# Patient Record
Sex: FEMALE | Race: WHITE | NOT HISPANIC OR LATINO | Employment: UNEMPLOYED | URBAN - METROPOLITAN AREA
[De-identification: names, ages, dates, MRNs, and addresses within clinical notes are randomized per-mention and may not be internally consistent; named-entity substitution may affect disease eponyms.]

---

## 2017-03-13 ENCOUNTER — OFFICE VISIT (OUTPATIENT)
Dept: URGENT CARE | Facility: CLINIC | Age: 37
End: 2017-03-13
Payer: COMMERCIAL

## 2017-03-13 VITALS
BODY MASS INDEX: 24.16 KG/M2 | HEART RATE: 83 BPM | SYSTOLIC BLOOD PRESSURE: 110 MMHG | DIASTOLIC BLOOD PRESSURE: 80 MMHG | TEMPERATURE: 98.5 F | OXYGEN SATURATION: 98 % | HEIGHT: 65 IN | WEIGHT: 145 LBS

## 2017-03-13 DIAGNOSIS — L74.519 FOCAL HYPERHIDROSIS: ICD-10-CM

## 2017-03-13 PROCEDURE — 99214 OFFICE O/P EST MOD 30 MIN: CPT | Performed by: PHYSICIAN ASSISTANT

## 2017-03-13 RX ORDER — GLYCOPYRROLATE 2 MG/1
1-2 TABLET ORAL
Qty: 30 TAB | Refills: 0 | Status: SHIPPED | OUTPATIENT
Start: 2017-03-13

## 2017-03-13 ASSESSMENT — ENCOUNTER SYMPTOMS
NAUSEA: 0
PALPITATIONS: 0
FEVER: 0
COUGH: 0
ABDOMINAL PAIN: 0
MYALGIAS: 0
CHILLS: 0
DIZZINESS: 0
VOMITING: 0
HEADACHES: 0

## 2017-03-13 NOTE — PATIENT INSTRUCTIONS
Freda Rosado PA-C    Hyperhidrosis  It is normal to sweat when you are hot, being physically active, or feeling anxious. Sweating is a necessary function for your body. However, hyperhidrosis is when you sweat too much (excessively). Although hyperhidrosis is not dangerous, it can make you feel embarrassed.   There are two kinds of hyperhidrosis:  · Primary hyperhidrosis. The sweating usually localizes in one part of your body, such as your underarms, or in a few areas, such as your feet, face, armpits, and hands. This is the more common kind of hyperhidrosis.  · Secondary hyperhidrosis. This type more likely affects your entire body.  CAUSES  The cause of your hyperhidrosis depends on the kind you have.  · Primary hyperhidrosis may be caused by having sweat glands that are more active than normal.  · Secondary hyperhidrosis is caused by an underlying condition. Possible conditions include:  ¨ Diabetes.  ¨ Gout.  ¨ Certain medicines.  ¨ Anxiety.  ¨ Stroke.  ¨ Obesity.  ¨ Menopause.  ¨ Overactive thyroid (hyperthyroidism).  ¨ Tumors.  ¨ Frostbite.  ¨ Certain types of cancers.  ¨ Alcoholism.  ¨ Injury to your nervous system.  ¨ Stroke.  ¨ Parkinson disease.  RISK FACTORS  You may be at an increased risk for primary hyperhidrosis if you have a family history of it.  SIGNS AND SYMPTOMS  General symptoms of hyperhidrosis may include:  · Feeling like you are sweating constantly, even while you are resting.  · Having skin that peels or gets paler or softer in the areas where you sweat the most.  · Being able to see sweat on your skin.  Symptoms of primary hyperhidrosis may include:  · Sweating in specific areas, such as your armpits, palms, feet, and face.  · Sweating in the same location on both sides of your body.  · Sweating only during the day.  Symptoms of secondary hyperhidrosis may include:  · Sweating all over your body.  · Sweating even while you sleep.  DIAGNOSIS   Hyperhidrosis may be diagnosed by:  · Medical  history and physical exam.  · Testing, such as:  ¨ Sweat test.  ¨ Paper test.  TREATMENT  Your treatment will depend on the kind of hyperhidrosis you have and the parts of your body that are affected. If your hyperhidrosis is caused by an underlying condition, your treatment will address the cause. Treatment may include:  · Strong antiperspirants. Your health care provider may give you a prescription.  · Medicines taken by mouth.  · Medicines injected by your health care provider. These may include small amounts of botulinum toxin.  · Iontophoresis. This is a procedure that temporarily turns off the sweat glands in your hands and feet.  · Surgery to remove your sweat glands.  · Sympathectomy. This is a procedure that cuts or destroys your nerves so that they do not send a signal to sweat.  HOME CARE INSTRUCTIONS  · Take medicines only as directed by your health care provider.  · Use antiperspirants as directed by your health care provider.  · Limit or avoid foods or beverages that seem to increase your chances of sweating, such as:  ¨ Spicy food.  ¨ Caffeine.  ¨ Alcohol.  ¨ Foods that contain MSG.  · If your feet sweat:  ¨ Wear sandals, when possible.  ¨ Do not wear cotton socks. Wear socks that remove or wick moisture from your feet.  ¨ Wear leather shoes.  ¨ Avoid wearing the same pair of shoes two days in a row.  · Consider joining a hyperhidrosis support group.  SEEK MEDICAL CARE IF:   · You have new symptoms.  · Your symptoms get worse.     This information is not intended to replace advice given to you by your health care provider. Make sure you discuss any questions you have with your health care provider.     Document Released: 02/16/2007 Document Revised: 01/08/2016 Document Reviewed: 07/28/2015  [employer] Interactive Patient Education ©2016 Elsevier Inc.

## 2017-03-13 NOTE — PROGRESS NOTES
Subjective:      Miranda Jovel is a 36 y.o. female who presents with No chief complaint on file.    Current medications reviewed.  No past medical history on file.  Social History   Substance Use Topics   • Smoking status: Current Every Day Smoker   • Smokeless tobacco: Not on file   • Alcohol Use: Not on file     Family History Reviewed: noncontributory      36-year-old female who was a patient of Dr. Lopez and has not replaced her primary care physician. She is on maintenance medication Robinul for hyperhidrosis which she reports works fabulously. She is wanting a refill of this medication until she sees/establishes a new provider.    HPI    Review of Systems   Constitutional: Negative for fever and chills.   Respiratory: Negative for cough.    Cardiovascular: Negative for chest pain and palpitations.   Gastrointestinal: Negative for nausea, vomiting and abdominal pain.   Musculoskeletal: Negative for myalgias and joint pain.   Skin:        Increased hydration of call, soles, axillary   Neurological: Negative for dizziness and headaches.   All other systems reviewed and are negative.         Objective:     There were no vitals taken for this visit.     Physical Exam   Constitutional: She is oriented to person, place, and time. She appears well-developed and well-nourished.   Cardiovascular: Normal rate and regular rhythm.    Pulmonary/Chest: Effort normal and breath sounds normal.   Neurological: She is alert and oriented to person, place, and time.   Skin: Skin is warm and dry.   Increased hydration noted on volar palm surface of bilateral hands and also bilateral axillary. Feet were not evaluated.   Nursing note and vitals reviewed.              Assessment/Plan:     1. Focal hyperhidrosis  glycopyrrolate (ROBINUL-FORTE) 2 MG tablet     RX: robinul . A VS given with information on condition, stress establish PCP.  Patient reports understanding and agrees with plan.

## 2017-03-13 NOTE — MR AVS SNAPSHOT
"        Miranda Dunawaygiorgio   3/13/2017 1:30 PM   Office Visit   MRN: [id number]    Department:  [facility]   Dept Phone:  [phone]    Description:  Female : 1980   Provider:  Kaylee Romero PA-C           Reason for Visit     Excessive Sweating medication refill       Allergies as of 3/13/2017     No Known Allergies      You were diagnosed with     Focal hyperhidrosis   [[id number]]         Vital Signs     Blood Pressure Pulse Temperature Height Weight Body Mass Index    110/80 mmHg 83 36.9 °C (98.5 °F) 1.651 m (5' 5\") 65.772 kg (145 lb) 24.13 kg/m2    Oxygen Saturation Smoking Status                98% Current Every Day Smoker          Basic Information     Date Of Birth Sex Race Ethnicity Preferred Language    1980 Female White Non- English      Health Maintenance        Date Due Completion Dates    IMM DTaP/Tdap/Td Vaccine (1 - Tdap) 1999 ---    PAP SMEAR 2001 ---    IMM INFLUENZA (1) 2016 ---            Current Immunizations     No immunizations on file.      Below and/or attached are the medications your provider expects you to take. Review all of your home medications and newly ordered medications with your provider and/or pharmacist. Follow medication instructions as directed by your provider and/or pharmacist. Please keep your medication list with you and share with your provider. Update the information when medications are discontinued, doses are changed, or new medications (including over-the-counter products) are added; and carry medication information at all times in the event of emergency situations     Allergies:  No Known Allergies          Medications  Valid as of: 2017 -  1:53 PM    Generic Name Brand Name Tablet Size Instructions for use    ALPRAZolam (Tab) XANAX 0.5 MG Take 1 Tab by mouth 3 times a day as needed for Sleep or Anxiety.        ALPRAZolam (Tab) XANAX 0.5 MG Take 1 Tab by mouth 3 times a day as needed for Sleep.       " Cyclobenzaprine HCl (Tab) FLEXERIL 5 MG Take 1-2 Tabs by mouth 3 times a day as needed for Muscle Spasms.        Doxycycline Hyclate   Take  by mouth.        Glycopyrrolate (Tab) ROBINUL 2 MG Take 2 mg by mouth 3 times a day.        Glycopyrrolate (Tab) ROBINUL 2 MG Take 0.5-1 Tabs by mouth 1 time daily as needed (hidrosis).        Ibuprofen (Tab) MOTRIN 600 MG Take 1 Tab by mouth every 8 hours as needed for Moderate Pain.        .                 Medicines prescribed today were sent to:     [facility], [place] - [address] [facility]  [address]    [address] [place] [address]    Phone: [phone] Fax: [phone]    Open 24 Hours?: No      Medication refill instructions:       If your prescription bottle indicates you have medication refills left, it is not necessary to call your provider’s office. Please contact your pharmacy and they will refill your medication.    If your prescription bottle indicates you do not have any refills left, you may request refills at any time through one of the following ways: The online [employer] system (except Urgent Care), by calling your provider’s office, or by asking your pharmacy to contact your provider’s office with a refill request. Medication refills are processed only during regular business hours and may not be available until the next business day. Your provider may request additional information or to have a follow-up visit with you prior to refilling your medication.   *Please Note: Medication refills are assigned a new Rx number when refilled electronically. Your pharmacy may indicate that no refills were authorized even though a new prescription for the same medication is available at the pharmacy. Please request the medicine by name with the pharmacy before contacting your provider for a refill.        Instructions    Freda Rosado PA-C    Hyperhidrosis  It is normal to sweat when you are hot, being physically active,  or feeling anxious. Sweating is a necessary function for your body. However, hyperhidrosis is when you sweat too much (excessively). Although hyperhidrosis is not dangerous, it can make you feel embarrassed.   There are two kinds of hyperhidrosis:  · Primary hyperhidrosis. The sweating usually localizes in one part of your body, such as your underarms, or in a few areas, such as your feet, face, armpits, and hands. This is the more common kind of hyperhidrosis.  · Secondary hyperhidrosis. This type more likely affects your entire body.  CAUSES  The cause of your hyperhidrosis depends on the kind you have.  · Primary hyperhidrosis may be caused by having sweat glands that are more active than normal.  · Secondary hyperhidrosis is caused by an underlying condition. Possible conditions include:  ¨ Diabetes.  ¨ Gout.  ¨ Certain medicines.  ¨ Anxiety.  ¨ Stroke.  ¨ Obesity.  ¨ Menopause.  ¨ Overactive thyroid (hyperthyroidism).  ¨ Tumors.  ¨ Frostbite.  ¨ Certain types of cancers.  ¨ Alcoholism.  ¨ Injury to your nervous system.  ¨ Stroke.  ¨ Parkinson disease.  RISK FACTORS  You may be at an increased risk for primary hyperhidrosis if you have a family history of it.  SIGNS AND SYMPTOMS  General symptoms of hyperhidrosis may include:  · Feeling like you are sweating constantly, even while you are resting.  · Having skin that peels or gets paler or softer in the areas where you sweat the most.  · Being able to see sweat on your skin.  Symptoms of primary hyperhidrosis may include:  · Sweating in specific areas, such as your armpits, palms, feet, and face.  · Sweating in the same location on both sides of your body.  · Sweating only during the day.  Symptoms of secondary hyperhidrosis may include:  · Sweating all over your body.  · Sweating even while you sleep.  DIAGNOSIS   Hyperhidrosis may be diagnosed by:  · Medical history and physical exam.  · Testing, such as:  ¨ Sweat test.  ¨ Paper test.  TREATMENT  Your  treatment will depend on the kind of hyperhidrosis you have and the parts of your body that are affected. If your hyperhidrosis is caused by an underlying condition, your treatment will address the cause. Treatment may include:  · Strong antiperspirants. Your health care provider may give you a prescription.  · Medicines taken by mouth.  · Medicines injected by your health care provider. These may include small amounts of botulinum toxin.  · Iontophoresis. This is a procedure that temporarily turns off the sweat glands in your hands and feet.  · Surgery to remove your sweat glands.  · Sympathectomy. This is a procedure that cuts or destroys your nerves so that they do not send a signal to sweat.  HOME CARE INSTRUCTIONS  · Take medicines only as directed by your health care provider.  · Use antiperspirants as directed by your health care provider.  · Limit or avoid foods or beverages that seem to increase your chances of sweating, such as:  ¨ Spicy food.  ¨ Caffeine.  ¨ Alcohol.  ¨ Foods that contain MSG.  · If your feet sweat:  ¨ Wear sandals, when possible.  ¨ Do not wear cotton socks. Wear socks that remove or wick moisture from your feet.  ¨ Wear leather shoes.  ¨ Avoid wearing the same pair of shoes two days in a row.  · Consider joining a hyperhidrosis support group.  SEEK MEDICAL CARE IF:   · You have new symptoms.  · Your symptoms get worse.     This information is not intended to replace advice given to you by your health care provider. Make sure you discuss any questions you have with your health care provider.     Document Released: 02/16/2007 Document Revised: 01/08/2016 Document Reviewed: 07/28/2015  "[employer] Interactive Patient Education ©2016 Elsevier Inc.            [employer] Access Code: [email or web address]  Expires: 4/12/2017  1:51 PM    [employer]  A secure, online tool to manage your health information     [employer]® is a secure, online tool that connects you to your personalized health  information from the privacy of your home -- day or night - making it very easy for you to manage your healthcare. Once the activation process is completed, you can even access your medical information using the [employer] jey, which is available for free in the [employer] Jey store or Google Play store.     [employer] provides the following levels of access (as shown below):   My Chart Features   [facility] Doctor [facility]  Specialists [facility]  Urgent  Care [facility]  Primary Care  Doctor   Email your healthcare team securely and privately 24/7 X X X    Manage appointments: schedule your next appointment; view details of past/upcoming appointments X      Request prescription refills. X      View recent personal medical records, including lab and immunizations X X X X   View health record, including health history, allergies, medications X X X X   Read reports about your outpatient visits, procedures, consult and ER notes X X X X   See your discharge summary, which is a recap of your hospital and/or ER visit that includes your diagnosis, lab results, and care plan. X X       How to register for [employer]:  1. Go to  [email or web address].  2. Click on the Sign Up Now box, which takes you to the New Member Sign Up page. You will need to provide the following information:  a. Enter your [employer] Access Code exactly as it appears at the top of this page. (You will not need to use this code after you’ve completed the sign-up process. If you do not sign up before the expiration date, you must request a new code.)   b. Enter your date of birth.   c. Enter your home email address.   d. Click Submit, and follow the next screen’s instructions.  3. Create a [employer]icePay ID. This will be your [employer] login ID and cannot be changed, so think of one that is secure and easy to remember.  4. Create a [employer] password. You can change your password at any time.  5. Enter your Password Reset Question and Answer. This can be used at a later  time if you forget your password.   6. Enter your e-mail address. This allows you to receive e-mail notifications when new information is available in [employer].  7. Click Sign Up. You can now view your health information.    For assistance activating your [employer] account, call [phone]

## 2017-06-21 ENCOUNTER — OFFICE VISIT (OUTPATIENT)
Dept: MEDICAL GROUP | Facility: MEDICAL CENTER | Age: 37
End: 2017-06-21
Payer: COMMERCIAL

## 2017-06-21 VITALS
HEART RATE: 89 BPM | WEIGHT: 155 LBS | TEMPERATURE: 99.1 F | OXYGEN SATURATION: 100 % | SYSTOLIC BLOOD PRESSURE: 108 MMHG | DIASTOLIC BLOOD PRESSURE: 70 MMHG | HEIGHT: 66 IN | RESPIRATION RATE: 18 BRPM | BODY MASS INDEX: 24.91 KG/M2

## 2017-06-21 DIAGNOSIS — R61 HYPERHIDROSIS: ICD-10-CM

## 2017-06-21 DIAGNOSIS — Z00.00 PREVENTATIVE HEALTH CARE: ICD-10-CM

## 2017-06-21 PROCEDURE — 99204 OFFICE O/P NEW MOD 45 MIN: CPT | Performed by: PHYSICIAN ASSISTANT

## 2017-06-21 RX ORDER — GLYCOPYRROLATE 2 MG/1
2 TABLET ORAL DAILY
Qty: 90 TAB | Refills: 1 | Status: SHIPPED | OUTPATIENT
Start: 2017-06-21 | End: 2018-04-11 | Stop reason: SDUPTHER

## 2017-06-21 ASSESSMENT — PATIENT HEALTH QUESTIONNAIRE - PHQ9: CLINICAL INTERPRETATION OF PHQ2 SCORE: 0

## 2017-06-21 NOTE — ASSESSMENT & PLAN NOTE
States she used to get sweating mainly over palms and soles in the past, however now gets sweaty in other area as well. Started robinul 2 mg qd 3 yrs ago which has helped a lot w her symptoms. States occasionally she gets some dryness and cotton mouth from medicine.

## 2017-06-21 NOTE — PROGRESS NOTES
Miranda Jovel is a 36 y.o. new female here for establishing care. Was under care of Dr. Lopez.   HPI:    Hyperhidrosis  New problem to me to discuss. States she used to get sweating mainly over palms and soles in the past, however now gets sweaty in other area as well. Started robinul 2 mg qd 3 yrs ago which has helped a lot w her symptoms. States occasionally she gets some dryness and cotton mouth from medicine.       Current medicines (including changes today)  Current Outpatient Prescriptions   Medication Sig Dispense Refill   • glycopyrrolate (ROBINUL) 2 MG tablet Take 1 Tab by mouth every day. 90 Tab 1   • glycopyrrolate (ROBINUL-FORTE) 2 MG tablet Take 0.5-1 Tabs by mouth 1 time daily as needed (hidrosis). 30 Tab 0   • DOXYCYCLINE HYCLATE PO Take  by mouth.     • cyclobenzaprine (FLEXERIL) 5 MG tablet Take 1-2 Tabs by mouth 3 times a day as needed for Muscle Spasms. 30 Tab 0   • ibuprofen (MOTRIN) 600 MG TABS Take 1 Tab by mouth every 8 hours as needed for Moderate Pain. 30 Tab 0   • alprazolam (XANAX) 0.5 MG TABS Take 1 Tab by mouth 3 times a day as needed for Sleep. 20 Tab 0   • alprazolam (XANAX) 0.5 MG TABS Take 1 Tab by mouth 3 times a day as needed for Sleep or Anxiety. (Patient not taking: Reported on 6/21/2017) 18 Tab 1     No current facility-administered medications for this visit.     She  has a past medical history of Hyperhidrosis of palms and soles.  She  has no past surgical history on file.  Social History   Substance Use Topics   • Smoking status: Current Some Day Smoker   • Smokeless tobacco: Never Used   • Alcohol Use: 0.6 - 1.2 oz/week     1-2 Glasses of wine per week     Social History     Social History Narrative     Family History   Problem Relation Age of Onset   • Thyroid Mother    • Heart Disease Mother    • Hypertension Brother      Family Status   Relation Status Death Age   • Mother Alive    • Father Alive    • Brother Alive        Past medical, surgical, family, and  "social history is reviewed in Epic chart by me today.   Medications and allergies reviewed in Epic chart by me today.       ROS  General:  No fevers or chills,    Eyes: no change in vision.   ENT:         Ears: No drainage. No change in hearing      Nose :No epitaxis        Mouth/ throat: No lesions. No sore throat  Resp: No difficulty breathing. No cough, wheezing.  CV: no SOB, no palpitation, no chest pain, no edema  GI: no nausea, no vomiting, no diarrhea or constipations. Bowel regular and normal. No melena or hematochezia. No hematemesis  : no Frequency, no urgency, no dysuria, no hematuria.   MS:  Negative for muscle pain or weakness.  Skin: no rashes or abnormal lesions.   Neuro: No seizures, no tingling or numbness.   Endo: no polyuria, no polydypsia, no cold intolerance, no heat intolerance  Psych: no depression, no anxiety, no Drug/Alcohol abuse  Hem: no easy bruising.    As documented in history of present illness  ROS neg:  All other review of systems were reviewed and negative.             Objective:     Blood pressure 108/70, pulse 89, temperature 37.3 °C (99.1 °F), resp. rate 18, height 1.689 m (5' 6.5\"), weight 70.308 kg (155 lb), last menstrual period 06/12/2017, SpO2 100 %. Body mass index is 24.65 kg/(m^2).  Physical Exam:    Constitutional: Well-developed and well-nourished. No distress.   Skin: Skin is warm and dry. No rashes in visible areas.  Nail: w/o pitting, ridging or onychomycosis   Head: Atraumatic without lesions.  Eyes: Equal, round and reactive, conjunctiva clear,sclera non icteric, lids normal.  Ears:  External ears unremarkable. Tympanic membranes clear and intact.  Nose: Nares patent. Septum midline. Turbinates without erythema nor edema. No discharge.    Mouth/Throat: Dentition is good. Tongue normal. Oropharynx is clear and moist. Posterior pharynx without erythema or exudates.  Neck: Supple, trachea midline.  No thyromegaly present. No lymphadenopathy--cervical or " supraclavicular  Cardiovascular: Regular rate and rhythm, without any murmurs.  No lower extremity edema. Cap refill < 3sec  Lung:  Clear to auscultation throughout w/o any wheeze or rhonchi. No adventitious sounds.    Abdomen: Soft, non tender, and without distention. Active bowel sounds in all four quadrants. No rebound, guarding, masses or HSM. No CVA tenderness  Musculoskeletal: All major joints without pain or swelling  Neurological: speech normal, mental status intact, gait grossly normal  Psychiatric:   Alert and oriented x3, normal affect and mood and behavior    Assessment and Plan:   The following treatment plan was discussed    1. Hyperhidrosis    - glycopyrrolate (ROBINUL) 2 MG tablet; Take 1 Tab by mouth every day.  Dispense: 90 Tab; Refill: 1    2. Preventative health care    - CBC WITH DIFFERENTIAL; Future  - COMP METABOLIC PANEL; Future  - LIPID PROFILE; Future  - TSH WITH REFLEX TO FT4; Future  - VITAMIN D,25 HYDROXY; Future      Gets Pap smear at Dr. Chávez's office    Records requested.    Followup: Return if symptoms worsen or fail to improve.         Please note that this dictation was created using voice recognition software. I have made every reasonable attempt to correct obvious errors, but I expect that there are errors of grammar and possibly content that I did not discover before finalizing the note

## 2018-02-02 ENCOUNTER — OFFICE VISIT (OUTPATIENT)
Dept: URGENT CARE | Facility: CLINIC | Age: 38
End: 2018-02-02
Payer: COMMERCIAL

## 2018-02-02 VITALS
DIASTOLIC BLOOD PRESSURE: 70 MMHG | WEIGHT: 164 LBS | OXYGEN SATURATION: 97 % | TEMPERATURE: 98.5 F | BODY MASS INDEX: 26.36 KG/M2 | HEIGHT: 66 IN | HEART RATE: 78 BPM | SYSTOLIC BLOOD PRESSURE: 110 MMHG

## 2018-02-02 DIAGNOSIS — R06.02 SHORTNESS OF BREATH: ICD-10-CM

## 2018-02-02 DIAGNOSIS — R11.0 NAUSEA: ICD-10-CM

## 2018-02-02 DIAGNOSIS — F41.9 ANXIETY: ICD-10-CM

## 2018-02-02 PROCEDURE — 99214 OFFICE O/P EST MOD 30 MIN: CPT | Performed by: PHYSICIAN ASSISTANT

## 2018-02-02 RX ORDER — ONDANSETRON 4 MG/1
4 TABLET, FILM COATED ORAL EVERY 6 HOURS PRN
Qty: 20 TAB | Refills: 1 | Status: SHIPPED | OUTPATIENT
Start: 2018-02-02

## 2018-02-02 RX ORDER — HYDROXYZINE HYDROCHLORIDE 25 MG/1
25 TABLET, FILM COATED ORAL 3 TIMES DAILY PRN
Qty: 30 TAB | Refills: 0 | Status: SHIPPED | OUTPATIENT
Start: 2018-02-02

## 2018-02-02 RX ORDER — FLUTICASONE PROPIONATE 50 MCG
2 SPRAY, SUSPENSION (ML) NASAL DAILY
Qty: 16 G | Refills: 0 | Status: SHIPPED | OUTPATIENT
Start: 2018-02-02

## 2018-02-02 ASSESSMENT — ENCOUNTER SYMPTOMS
MYALGIAS: 0
FEVER: 0
ABDOMINAL PAIN: 0
SHORTNESS OF BREATH: 1
NERVOUS/ANXIOUS: 1
TINGLING: 1
NAUSEA: 1
PALPITATIONS: 0
BLURRED VISION: 0
DIARRHEA: 0
VOMITING: 0
WHEEZING: 0
CHILLS: 0

## 2018-02-02 NOTE — PROGRESS NOTES
"Subjective:   Miranda Jovel is a 37 y.o. female who presents for Shortness of Breath (\"dizziness,tingling hands,nausea xlast night )        Shortness of Breath   This is a new problem. The current episode started yesterday. Pertinent negatives include no abdominal pain, chest pain, fever, leg swelling, rash, vomiting or wheezing.   notes pt awoke in the night feeling shortness of breath, felt, felt fingers getting tingly, c/o nausea, PMH of anxiety attack, PMH of vertigo, \"I think it's anxiety\". C/o dizziness this am. Denies dizziness but feels mildly \"woozy\". Kids had flu last week. Daughter had ECG w/ high fever last week. PMH of anxiety has been managed w/ xanax. PCP had been Dr Armando.     Review of Systems   Constitutional: Negative for chills and fever.   Eyes: Negative for blurred vision.   Respiratory: Positive for shortness of breath. Negative for wheezing.    Cardiovascular: Negative for chest pain, palpitations and leg swelling.   Gastrointestinal: Positive for nausea. Negative for abdominal pain, diarrhea and vomiting.   Genitourinary: Negative for dysuria, frequency and hematuria.   Musculoskeletal: Negative for myalgias.   Skin: Negative for rash.   Neurological: Positive for tingling ( to hands last night).   Endo/Heme/Allergies: Positive for environmental allergies.   Psychiatric/Behavioral: The patient is nervous/anxious.      No Known Allergies   I have worn a mask for the entire encounter with this patient.      Objective:   /70   Pulse 78   Temp 36.9 °C (98.5 °F)   Ht 1.676 m (5' 6\")   Wt 74.4 kg (164 lb)   SpO2 97%   BMI 26.47 kg/m²   Physical Exam   Constitutional: She is oriented to person, place, and time. She appears well-developed and well-nourished. No distress.   HENT:   Head: Normocephalic and atraumatic.   Right Ear: Tympanic membrane, external ear and ear canal normal.   Left Ear: Tympanic membrane, external ear and ear canal normal.   Nose: Nose normal. "   Mouth/Throat: Uvula is midline and mucous membranes are normal. Posterior oropharyngeal erythema ( mild PND) present. No oropharyngeal exudate, posterior oropharyngeal edema or tonsillar abscesses.   Eyes: Conjunctivae and lids are normal. Right eye exhibits no discharge. Left eye exhibits no discharge. No scleral icterus.   Neck: Neck supple.   Pulmonary/Chest: Effort normal. No respiratory distress. She has no decreased breath sounds. She has no wheezes. She has no rhonchi. She has no rales.   Musculoskeletal: Normal range of motion.   Lymphadenopathy:     She has cervical adenopathy ( mild bilat).   Neurological: She is alert and oriented to person, place, and time. She is not disoriented.   Skin: Skin is warm and dry. She is not diaphoretic. No erythema. No pallor.   Psychiatric: Her behavior is normal. Her mood appears anxious. Her speech is rapid and/or pressured (very slight but appreciable). She expresses no suicidal plans and no homicidal plans.   Nursing note and vitals reviewed.        Assessment/Plan:   Assessment    1. Anxiety  Discussed s/sx c/w anxiety, Normal vitals and exam today, has appt w/ PCP on Tuesday to discuss further, had episode w/ daughters health thought to be anxiety trigger, zofran for nausea, trend s/sxl, trial atarax for anxiety over weekend and f/u w/ PCP  Return to clinic with lack of resolution or progression of symptoms.  ER precautions with any worsening symptoms are reviewed with patient/caregiver and they do express understanding    - hydrOXYzine HCl (ATARAX) 25 MG Tab; Take 1 Tab by mouth 3 times a day as needed for Itching.  Dispense: 30 Tab; Refill: 0  - fluticasone (FLONASE) 50 MCG/ACT nasal spray; Spray 2 Sprays in nose every day.  Dispense: 16 g; Refill: 0    2. Nausea    - ondansetron (ZOFRAN) 4 MG Tab tablet; Take 1 Tab by mouth every 6 hours as needed for Nausea/Vomiting.  Dispense: 20 Tab; Refill: 1    3. Shortness of breath      Differential diagnosis, natural  history, supportive care, and indications for immediate follow-up discussed.

## 2018-04-11 DIAGNOSIS — R61 HYPERHIDROSIS: ICD-10-CM

## 2018-04-11 RX ORDER — GLYCOPYRROLATE 2 MG/1
TABLET ORAL
Qty: 90 TAB | Refills: 0 | Status: SHIPPED | OUTPATIENT
Start: 2018-04-11 | End: 2018-08-08 | Stop reason: SDUPTHER

## 2018-08-08 DIAGNOSIS — R61 HYPERHIDROSIS: ICD-10-CM

## 2018-08-08 RX ORDER — GLYCOPYRROLATE 2 MG/1
TABLET ORAL
Qty: 90 TAB | Refills: 3 | Status: SHIPPED | OUTPATIENT
Start: 2018-08-08 | End: 2020-01-16

## 2020-01-16 DIAGNOSIS — R61 HYPERHIDROSIS: ICD-10-CM

## 2020-01-16 NOTE — TELEPHONE ENCOUNTER
Was the patient seen in the last year in this department? Yes    Does patient have an active prescription for medications requested? Yes    Received Request Via: Pharmacy- Called left vm to call back to schedule an appointment haven't been seen in 2 years

## 2020-01-17 RX ORDER — GLYCOPYRROLATE 2 MG/1
TABLET ORAL
Qty: 90 TAB | Refills: 0 | Status: SHIPPED | OUTPATIENT
Start: 2020-01-17 | End: 2020-01-21 | Stop reason: SDUPTHER

## 2020-01-21 ENCOUNTER — OFFICE VISIT (OUTPATIENT)
Dept: MEDICAL GROUP | Facility: MEDICAL CENTER | Age: 40
End: 2020-01-21
Payer: COMMERCIAL

## 2020-01-21 VITALS
WEIGHT: 145.72 LBS | SYSTOLIC BLOOD PRESSURE: 122 MMHG | OXYGEN SATURATION: 98 % | TEMPERATURE: 97 F | RESPIRATION RATE: 18 BRPM | BODY MASS INDEX: 24.28 KG/M2 | HEIGHT: 65 IN | DIASTOLIC BLOOD PRESSURE: 78 MMHG | HEART RATE: 68 BPM

## 2020-01-21 DIAGNOSIS — Z00.00 PREVENTATIVE HEALTH CARE: ICD-10-CM

## 2020-01-21 DIAGNOSIS — Z00.00 ADULT GENERAL MEDICAL EXAM: ICD-10-CM

## 2020-01-21 DIAGNOSIS — R61 HYPERHIDROSIS: ICD-10-CM

## 2020-01-21 PROCEDURE — 99395 PREV VISIT EST AGE 18-39: CPT | Performed by: PHYSICIAN ASSISTANT

## 2020-01-21 RX ORDER — GLYCOPYRROLATE 2 MG/1
2 TABLET ORAL
Qty: 90 TAB | Refills: 3 | Status: SHIPPED | OUTPATIENT
Start: 2020-01-21 | End: 2020-07-28

## 2020-01-21 SDOH — HEALTH STABILITY: MENTAL HEALTH: HOW OFTEN DO YOU HAVE 6 OR MORE DRINKS ON ONE OCCASION?: NEVER

## 2020-01-21 SDOH — HEALTH STABILITY: MENTAL HEALTH: HOW OFTEN DO YOU HAVE A DRINK CONTAINING ALCOHOL?: NEVER

## 2020-01-21 ASSESSMENT — PATIENT HEALTH QUESTIONNAIRE - PHQ9: CLINICAL INTERPRETATION OF PHQ2 SCORE: 0

## 2020-01-21 NOTE — PROGRESS NOTES
"Chief Complaint   Patient presents with   • Other     patient hasnt been in office for awhile wants to talk about health   • Medication Refill       HPI  Miranda Jovel is a 39 y.o. female here today for  routin health exam.  Patient is generally healthy.  Has hyperhidrosis and is taking glycopyrrolate 1 to 2 mg once daily.  States she stopped medicine for a while however her sweating was really bad and she had to restart the medicine.  She has constipation which gets worse with medicine however she treats with MiraLAX.        Past medical, surgical, family, and social history is reviewed in Epic chart by me today.   Medications and allergies reviewed and updated in Epic chart by me today.     ROS:   As documented in history of present illness above    Exam:  /78 (BP Location: Right arm, Patient Position: Sitting, BP Cuff Size: Adult)   Pulse 68   Temp 36.1 °C (97 °F) (Temporal)   Resp 18   Ht 1.651 m (5' 5\")   Wt 66.1 kg (145 lb 11.6 oz)   SpO2 98%   Constitutional: Alert, no distress, plus 3 vital signs  Skin:  Warm, dry, no rashes invisible areas  Respiratory: Unlabored respiratory effort, lungs clear to auscultation, no wheezes, no rhonchi  Cardiovascular: RRR, no murmur, no lower extremities edema,  Psych: Alert, pleasant, well-groomed, normal affect    A/P:    1. Preventative health care    - CBC WITH DIFFERENTIAL; Future  - Comp Metabolic Panel; Future  - Lipid Profile; Future  - TSH WITH REFLEX TO FT4; Future  - VITAMIN D,25 HYDROXY; Future    2. Hyperhidrosis    - glycopyrrolate (ROBINUL) 2 MG tablet; Take 1 Tab by mouth every day.  Dispense: 90 Tab; Refill: 3    3. Adult general medical exam    F/u pr   "

## 2020-07-08 ENCOUNTER — NURSE TRIAGE (OUTPATIENT)
Dept: HEALTH INFORMATION MANAGEMENT | Facility: OTHER | Age: 40
End: 2020-07-08

## 2020-07-08 NOTE — TELEPHONE ENCOUNTER
1. Caller Name: Miranda Jovel                Call Back Number: Miranda Jovel  [facility] or [facility] Provider: Yes Alexia Bearden        2.  In the last two weeks, has the patient had any new or worsening symptoms (not explained by alternative diagnosis)? Yes, the patient reports the following COVID-19 consistent symptoms: sore throat. On and off irritation, redness at tongue and back of throat when eating certain foods like chedar cheese, no pain swallowing just irritation    3.  Does patient have any comoribidities? None     4.  Has the patient traveled in the last 14 days OR had any known contact with someone who is suspected or confirmed to have COVID-19?  No.    5. Disposition: Offered patient virtual visit to limit potential exposure to others; patient also given self care instructions    Note routed to [facility] Provider: [facility] only.   Patient said to monitor sx and will call back

## 2020-07-08 NOTE — TELEPHONE ENCOUNTER
Regarding: burning in throat and dry mouth   ----- Message from Alexei Pinedo sent at 7/8/2020 11:01 AM PDT -----  Pt called and she getting burning in her throat after eating comes and goes and gets dry mouth will have her screened before she does in person appt with PCP she declined My Chart

## 2020-07-24 DIAGNOSIS — R61 HYPERHIDROSIS: ICD-10-CM

## 2020-07-28 RX ORDER — GLYCOPYRROLATE 2 MG/1
TABLET ORAL
Qty: 90 TAB | Refills: 3 | Status: SHIPPED | OUTPATIENT
Start: 2020-07-28 | End: 2020-09-01 | Stop reason: SDUPTHER

## 2020-09-01 ENCOUNTER — OFFICE VISIT (OUTPATIENT)
Dept: MEDICAL GROUP | Facility: MEDICAL CENTER | Age: 40
End: 2020-09-01
Payer: COMMERCIAL

## 2020-09-01 VITALS
BODY MASS INDEX: 22.46 KG/M2 | RESPIRATION RATE: 20 BRPM | HEIGHT: 65 IN | DIASTOLIC BLOOD PRESSURE: 92 MMHG | SYSTOLIC BLOOD PRESSURE: 100 MMHG | TEMPERATURE: 98.4 F | OXYGEN SATURATION: 100 % | HEART RATE: 61 BPM | WEIGHT: 134.8 LBS

## 2020-09-01 DIAGNOSIS — Z00.00 PREVENTATIVE HEALTH CARE: ICD-10-CM

## 2020-09-01 DIAGNOSIS — R61 HYPERHIDROSIS: ICD-10-CM

## 2020-09-01 DIAGNOSIS — Z12.39 BREAST CANCER SCREENING: ICD-10-CM

## 2020-09-01 DIAGNOSIS — L98.9 SKIN LESION: ICD-10-CM

## 2020-09-01 PROCEDURE — 99213 OFFICE O/P EST LOW 20 MIN: CPT | Performed by: PHYSICIAN ASSISTANT

## 2020-09-01 RX ORDER — GLYCOPYRROLATE 2 MG/1
2 TABLET ORAL
Qty: 90 TAB | Refills: 3 | Status: SHIPPED | OUTPATIENT
Start: 2020-09-01

## 2020-09-01 NOTE — PROGRESS NOTES
"Chief Complaint   Patient presents with   • Warts     On lower legs x 2wks        HPI  Miranda Jovel is a 40 y.o. female here today for:    Patient has not noticed couple red spots over back of the thigh, no itchiness.  Also has another spot over back of L leg, not itchy.    Patient extrarenal for excessive sweating.  Had left her prescription while visiting her mom.  Said he feels      Past medical, surgical, family, and social history is reviewed in Epic chart by me today.   Medications and allergies reviewed and updated in Epic chart by me today.     ROS:   As documented in history of present illness above    Exam:  /92 (BP Location: Left arm, Patient Position: Sitting, BP Cuff Size: Adult long)   Pulse 61   Temp 36.9 °C (98.4 °F) (Temporal)   Resp 20   Ht 1.651 m (5' 5\")   Wt 61.1 kg (134 lb 12.8 oz)   SpO2 100%   Constitutional: Alert, no distress, plus 3 vital signs  Skin: There is a small raised red skin lesion about 1 x 1 mm liver block of left thigh.  Diabetes slightly raised flat circular lesion over back of left leg about 3 x 3 mm.      Eye: Equal, round and reactive, conjunctiva clear  ENMT: Lips without lesions, good dentition, oropharynx clear    Neck: Trachea midline, no masses, no thyromegaly  Respiratory: Unlabored respiratory effort, lungs clear to auscultation, no wheezes, no rhonchi  Cardiovascular: RRR, no murmur, no lower extremities edema, pedal pulses equal bilaterally and 2+/4   Abdomen: Soft, nontender, no masses or hepatosplenomegaly  Psych: Alert, pleasant, well-groomed, normal affect    A/P:  1. Hyperhidrosis    - glycopyrrolate (ROBINUL) 2 MG tablet; Take 1 Tab by mouth every day.  Dispense: 90 Tab; Refill: 3    2. Preventative health care    - CBC WITH DIFFERENTIAL; Future  - Comp Metabolic Panel; Future  - Lipid Profile; Future  - TSH WITH REFLEX TO FT4; Future  - VITAMIN D,25 HYDROXY; Future    3. Breast cancer screening    - MA-SCREENING MAMMO BILAT W/CAD; " Future    4. Skin lesion  There are only 2 skin lesions noted noted today, look non-concerning.  No itchiness no infection noted.  Advised patient to keep an eye on it and follow-up if spreading, changing in size or color.    F/U: prn

## 2024-11-08 ENCOUNTER — OFFICE VISIT (OUTPATIENT)
Dept: OBGYN | Facility: CLINIC | Age: 44
End: 2024-11-08
Payer: COMMERCIAL

## 2024-11-08 ENCOUNTER — HOSPITAL ENCOUNTER (OUTPATIENT)
Facility: MEDICAL CENTER | Age: 44
End: 2024-11-08
Attending: NURSE PRACTITIONER
Payer: COMMERCIAL

## 2024-11-08 VITALS
HEIGHT: 65 IN | WEIGHT: 138.4 LBS | DIASTOLIC BLOOD PRESSURE: 56 MMHG | SYSTOLIC BLOOD PRESSURE: 100 MMHG | BODY MASS INDEX: 23.06 KG/M2

## 2024-11-08 DIAGNOSIS — Z01.419 ENCOUNTER FOR ANNUAL ROUTINE GYNECOLOGICAL EXAMINATION: ICD-10-CM

## 2024-11-08 DIAGNOSIS — Z12.4 CERVICAL CANCER SCREENING: ICD-10-CM

## 2024-11-08 PROCEDURE — 87491 CHLMYD TRACH DNA AMP PROBE: CPT

## 2024-11-08 PROCEDURE — 87591 N.GONORRHOEAE DNA AMP PROB: CPT

## 2024-11-08 PROCEDURE — 88142 CYTOPATH C/V THIN LAYER: CPT

## 2024-11-08 PROCEDURE — G0101 CA SCREEN;PELVIC/BREAST EXAM: HCPCS | Performed by: NURSE PRACTITIONER

## 2024-11-08 PROCEDURE — 87624 HPV HI-RISK TYP POOLED RSLT: CPT

## 2024-11-08 RX ORDER — DEXTROAMPHETAMINE SACCHARATE, AMPHETAMINE ASPARTATE, DEXTROAMPHETAMINE SULFATE AND AMPHETAMINE SULFATE 5; 5; 5; 5 MG/1; MG/1; MG/1; MG/1
20 TABLET ORAL 2 TIMES DAILY
COMMUNITY

## 2024-11-08 NOTE — PROGRESS NOTES
HPI Comments: Miranda White is a 44 y.o. y.o. female who presents for her Gynecologic Exam.       Pt has no complaints today.   Pt is sexually active with one female partner, long term relationship  She is currently not using BCM  LMP: 10/18/2024  Last pap/results: 2022/normal    Pt reports history vaginal wall cyst. Has had MRI in past - benign   Pt has breast US and bx of small cyst on RT breast in 2024-benign.  .  ROS: Patient is feeling well. No dyspnea or chest pain on exertion. No Abdominal pain, change in bowel habits, black or bloody stools. No urinary sx. GYN ROS:normal menses, no abnormal bleeding, pelvic pain or discharge, no breast pain or new or enlarging lumps on self exam.   Denies breast tenderness, mass, discharge, changes in size or contour, or abnormal cyclic discomfort.   No neurological complaints.  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  Social History     Socioeconomic History    Marital status:    Tobacco Use    Smoking status: Former     Types: Cigarettes     Passive exposure: Never    Smokeless tobacco: Never   Vaping Use    Vaping status: Never Used   Substance and Sexual Activity    Alcohol use: Not Currently     Alcohol/week: 0.6 - 1.2 oz     Types: 1 - 2 Glasses of wine per week     Comment: socially    Drug use: Yes     Types: Marijuana    Sexual activity: Yes     Partners: Male     Comment: none     Family History   Problem Relation Age of Onset    Thyroid Mother     Heart Disease Mother     No Known Problems Father     Hypertension Brother     Heart Disease Maternal Grandmother     No Known  "Problems Maternal Grandfather     Cancer Paternal Grandmother         rectal    Cancer Paternal Grandfather         prostate     Medications:   Current Outpatient Medications Ordered in Epic   Medication Sig Dispense Refill    amphetamine-dextroamphetamine (ADDERALL) 20 MG Tab Take 20 mg by mouth 2 times a day.      glycopyrrolate (ROBINUL) 2 MG tablet Take 1 Tab by mouth every day. 90 Tab 3    DOXYCYCLINE HYCLATE PO Take  by mouth. (Patient not taking: Reported on 11/8/2024)       No current [facility]-ordered facility-administered medications on file.          Objective:   Vital measurements:  /56   Ht 5' 5\"   Wt 138 lb 6.4 oz   Body mass index is 23.03 kg/m². (Goal BM I>18 <25)    Physical Exam   Nursing note and vitals reviewed.  Constitutional: She is oriented to person, place, and time. She appears well-developed and well-nourished. No distress.     Neck: Normal range of motion. Neck supple. No tracheal deviation present. No thyromegaly present.     Pulmonary/Chest: Effort normal and breath sounds normal. No respiratory distress. She has no wheezes. She has no rales. She exhibits no tenderness.     Cardiovascular: Regular, rate and rhythm. No JVD.    Abdominal: Soft. Bowel sounds are normal. She exhibits no distension and no mass. No tenderness. She has no rebound and no guarding.     Breast:  Symmetrical, normal consistency without masses.    Genitourinary:  Pelvic exam was performed with patient supine.  External genitalia with no abnormal pigmentation, labial fusion,rash, tenderness, lesion or injury to the labia bilaterally.  Vagina is moist with no lesions, foul discharge, erythema, tenderness or bleeding. No foreign body around the vagina or signs of injury.   Cervix exhibits no motion tenderness, no discharge and no friability.   Uterus is midline not deviated, not enlarged, not fixed and not tender.  Right adnexum displays no mass, no tenderness and no fullness. Left adnexum displays no mass, no " tenderness and no fullness.   2cm round, nontender cyst noted on RT vaginal wall.    Musculoskeletal: Normal range of motion. She exhibits no edema and no tenderness.     Lymphadenopathy: She has no cervical adenopathy.     Neurological: She is alert and oriented to person, place, and time. She exhibits normal muscle tone.     Skin: Skin is warm and dry. No rash noted. She is not diaphoretic. No erythema. No pallor.     Psychiatric: She has a normal mood and affect. Her behavior is normal. Judgment and thought content normal.          Assessment:     1. Encounter for annual routine gynecological examination  THINPREP PAP W/HPV AND CTNG      2. Cervical cancer screening  THINPREP PAP W/HPV AND CTNG          Assessment:  well woman      Plan:     1. Encounter for annual routine gynecological examination  THINPREP PAP W/HPV AND CTNG      2. Cervical cancer screening  THINPREP PAP W/HPV AND CTNG          Miranda White is a 44 y.o.  female who presents for her annual gynecologic exam.   # Preventative health care:   --Breast self awareness discussed. Mammogram done in (annually starting at age 40).  --Cervical cancer screening. Last Pap . Collected today. HPV also sent. I will follow up with patient once results are back as per ASCCP guidelines.   --Smoking n/a.   --Colorectal screening not indicated.   --Immunizations are up to date.  # Contraception: n/a  # STD screening: n/a  # Follow up for annual exam.    CHRISTOPHE Mcdaniel.

## 2024-11-08 NOTE — PROGRESS NOTES
Patient here for New GYN annual  LMP: 10/18/2024  Last pap: 2 yrs at [facility] per pt   Last mammo 2024 had breast biopsy WNL   # [phone] (home)   Pt states has a large cyst that she has had an MRI for, flared up when she 20 yrs old.

## 2024-11-09 LAB
C TRACH DNA GENITAL QL NAA+PROBE: NEGATIVE
N GONORRHOEA DNA GENITAL QL NAA+PROBE: NEGATIVE
SPECIMEN SOURCE: NORMAL

## 2024-11-18 LAB
HPV I/H RISK 1 DNA SPEC QL NAA+PROBE: NOT DETECTED
SPECIMEN SOURCE: NORMAL
THINPREP PAP, CYTOLOGY NL11781: NORMAL

## 2024-11-19 ENCOUNTER — TELEPHONE (OUTPATIENT)
Dept: OBGYN | Facility: CLINIC | Age: 44
End: 2024-11-19
Payer: COMMERCIAL

## 2024-11-19 NOTE — TELEPHONE ENCOUNTER
----- Message from Nurse Practitioner MAHESH Mcdaniel sent at 11/19/2024  7:54 AM PST -----  Please contact pt or send letter with negative pap results. Her pap showed inflammation that could be indicative for BV. If she is having symptoms of fishy smell, discharge or itching, she can make an appointment to have a vaginal pathogen obtained.    11/19/2024 1210  Called pt and informed as above. Pt verbalized understanding. Pt stated that she is not having any symptoms.

## 2024-12-02 ENCOUNTER — HOSPITAL ENCOUNTER (OUTPATIENT)
Facility: MEDICAL CENTER | Age: 44
End: 2024-12-02
Attending: NURSE PRACTITIONER
Payer: COMMERCIAL

## 2024-12-02 ENCOUNTER — APPOINTMENT (OUTPATIENT)
Dept: OBGYN | Facility: CLINIC | Age: 44
End: 2024-12-02
Payer: COMMERCIAL

## 2024-12-02 VITALS
BODY MASS INDEX: 22.49 KG/M2 | HEIGHT: 65 IN | WEIGHT: 135 LBS | SYSTOLIC BLOOD PRESSURE: 110 MMHG | DIASTOLIC BLOOD PRESSURE: 70 MMHG

## 2024-12-02 DIAGNOSIS — N76.0 BV (BACTERIAL VAGINOSIS): ICD-10-CM

## 2024-12-02 DIAGNOSIS — B37.2 YEAST INFECTION OF THE SKIN: ICD-10-CM

## 2024-12-02 DIAGNOSIS — B96.89 BV (BACTERIAL VAGINOSIS): ICD-10-CM

## 2024-12-02 PROCEDURE — 3074F SYST BP LT 130 MM HG: CPT | Performed by: NURSE PRACTITIONER

## 2024-12-02 PROCEDURE — 87660 TRICHOMONAS VAGIN DIR PROBE: CPT

## 2024-12-02 PROCEDURE — 87480 CANDIDA DNA DIR PROBE: CPT

## 2024-12-02 PROCEDURE — 99212 OFFICE O/P EST SF 10 MIN: CPT | Performed by: NURSE PRACTITIONER

## 2024-12-02 PROCEDURE — 3078F DIAST BP <80 MM HG: CPT | Performed by: NURSE PRACTITIONER

## 2024-12-02 PROCEDURE — 87510 GARDNER VAG DNA DIR PROBE: CPT

## 2024-12-02 NOTE — PROGRESS NOTES
S:  Miranda White, a 44 yr old femle is here for a follow up to discuss lab findings. She reports she has symptoms of yeast on her skin and has been seen by a Dermatologist. She sometimes feels a little vaginal burning.     O:   VSS  Alert, no signs of distress  AAO x3  Answers questions appropriately and clearly  Pap negative with negative HPV    A/P:  Discussed pH imbalance, diet, sexual hygiene.   Vagina pathogen swab collected and sent.

## 2024-12-02 NOTE — PROGRESS NOTES
Patient here for GYN exam.  Wants to discuss her pap results.  LMP= 11/15/24  BCM: none  Last pap:11/8/24=WNL  Last mammogram if applies: 2024=had biopsies= WNL  Phone number: [phone]  Pharmacy verified

## 2024-12-03 LAB
CANDIDA DNA VAG QL PROBE+SIG AMP: NEGATIVE
G VAGINALIS DNA VAG QL PROBE+SIG AMP: POSITIVE
T VAGINALIS DNA VAG QL PROBE+SIG AMP: NEGATIVE

## 2024-12-09 ENCOUNTER — TELEPHONE (OUTPATIENT)
Dept: OBGYN | Facility: CLINIC | Age: 44
End: 2024-12-09
Payer: COMMERCIAL

## 2024-12-09 RX ORDER — METRONIDAZOLE 500 MG/1
500 TABLET ORAL 2 TIMES DAILY
Qty: 14 TABLET | Refills: 0 | Status: SHIPPED | OUTPATIENT
Start: 2024-12-09 | End: 2024-12-16

## 2024-12-09 NOTE — TELEPHONE ENCOUNTER
Pt called in asking about her vaginal pathogen results from 12/2/24. I read her the results and relayed Ruby's message that the swab came back negative for yeast but did come back positive for BV which is NOT an STI. I let her know she sent in Metronidazole to the [facility] pharmacy we have on file. I explained to the pt how to take the medication and told her to follow back up with us after tx if she is still having vaginal symptoms. Pt agreed and call ended.